# Patient Record
Sex: MALE | Race: BLACK OR AFRICAN AMERICAN
[De-identification: names, ages, dates, MRNs, and addresses within clinical notes are randomized per-mention and may not be internally consistent; named-entity substitution may affect disease eponyms.]

---

## 2017-01-09 ENCOUNTER — HOSPITAL ENCOUNTER (EMERGENCY)
Dept: HOSPITAL 35 - ER | Age: 77
Discharge: HOME | End: 2017-01-09
Payer: COMMERCIAL

## 2017-01-09 VITALS — SYSTOLIC BLOOD PRESSURE: 113 MMHG | DIASTOLIC BLOOD PRESSURE: 67 MMHG

## 2017-01-09 VITALS — WEIGHT: 160.01 LBS | BODY MASS INDEX: 22.4 KG/M2 | HEIGHT: 71 IN

## 2017-01-09 DIAGNOSIS — T83.098D: ICD-10-CM

## 2017-01-09 DIAGNOSIS — N39.0: Primary | ICD-10-CM

## 2017-01-09 DIAGNOSIS — E11.9: ICD-10-CM

## 2017-01-09 DIAGNOSIS — L03.311: ICD-10-CM

## 2017-01-09 DIAGNOSIS — K59.00: ICD-10-CM

## 2017-01-09 LAB
ALBUMIN SERPL-MCNC: 3.6 G/DL (ref 3.4–5)
ALP SERPL-CCNC: 145 U/L (ref 46–116)
ALT SERPL-CCNC: 17 U/L (ref 30–65)
ANION GAP SERPL CALC-SCNC: 9 MMOL/L (ref 7–16)
AST SERPL-CCNC: 11 U/L (ref 15–37)
BASOPHILS NFR BLD AUTO: 0.7 % (ref 0–2)
BILIRUB SERPL-MCNC: 0.4 MG/DL
BILIRUB UR-MCNC: NEGATIVE MG/DL
BUN SERPL-MCNC: 19 MG/DL (ref 7–18)
CALCIUM SERPL-MCNC: 8.9 MG/DL (ref 8.5–10.1)
CHLORIDE SERPL-SCNC: 103 MMOL/L (ref 98–107)
CO2 SERPL-SCNC: 26 MMOL/L (ref 21–32)
COLOR UR: YELLOW
CREAT SERPL-MCNC: 0.7 MG/DL (ref 0.6–1.3)
EOSINOPHIL NFR BLD: 1.9 % (ref 0–3)
ERYTHROCYTE [DISTWIDTH] IN BLOOD BY AUTOMATED COUNT: 15.2 % (ref 10.5–14.5)
GLUCOSE SERPL-MCNC: 110 MG/DL (ref 70–99)
GRANULOCYTES NFR BLD MANUAL: 71.2 % (ref 36–66)
HCT VFR BLD CALC: 43.4 % (ref 42–52)
HGB BLD-MCNC: 14.6 GM/DL (ref 14–18)
KETONES UR STRIP-MCNC: (no result) MG/DL
LYMPHOCYTES NFR BLD AUTO: 16.3 % (ref 24–44)
MANUAL DIFFERENTIAL PERFORMED BLD QL: NO
MCH RBC QN AUTO: 30.4 PG (ref 26–34)
MCHC RBC AUTO-ENTMCNC: 33.6 % (ref 28–37)
MCV RBC: 90.6 FL (ref 80–100)
MONOCYTES NFR BLD: 9.9 % (ref 1–8)
NEUTROPHILS # BLD: 8.6 THOU/UL (ref 1.4–8.2)
PLATELET # BLD: 191 THOU/UL (ref 150–400)
POTASSIUM SERPL-SCNC: 4.2 MMOL/L (ref 3.5–5.1)
PROT SERPL-MCNC: 8.7 G/DL (ref 6.4–8.2)
RBC # BLD AUTO: 4.79 MIL/UL (ref 4.5–6)
RBC # UR STRIP: (no result) /UL
SODIUM SERPL-SCNC: 138 MMOL/L (ref 136–145)
SP GR UR STRIP: 1.02 (ref 1–1.03)
SQUAMOUS: (no result) /LPF (ref 0–3)
URINE GLUCOSE-RANDOM*: NEGATIVE
URINE LEUKOCYTES-REFLEX: (no result)
URINE PROTEIN (DIPSTICK): (no result)
URINE WBC-REFLEX: (no result) /HPF (ref 0–5)
UROBILINOGEN UR STRIP-ACNC: 1 E.U./DL (ref 0.2–1)
WBC # BLD AUTO: 12.1 THOU/UL (ref 4–11)

## 2018-06-02 ENCOUNTER — HOSPITAL ENCOUNTER (INPATIENT)
Dept: HOSPITAL 35 - ER | Age: 78
LOS: 2 days | Discharge: HOME HEALTH | DRG: 698 | End: 2018-06-04
Attending: INTERNAL MEDICINE | Admitting: INTERNAL MEDICINE
Payer: COMMERCIAL

## 2018-06-02 VITALS — WEIGHT: 157.2 LBS | HEIGHT: 71 IN | BODY MASS INDEX: 22.01 KG/M2

## 2018-06-02 DIAGNOSIS — Z93.1: ICD-10-CM

## 2018-06-02 DIAGNOSIS — L89.614: ICD-10-CM

## 2018-06-02 DIAGNOSIS — N39.0: ICD-10-CM

## 2018-06-02 DIAGNOSIS — T83.518A: Primary | ICD-10-CM

## 2018-06-02 DIAGNOSIS — L89.152: ICD-10-CM

## 2018-06-02 DIAGNOSIS — E11.9: ICD-10-CM
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
## 2018-06-02 DIAGNOSIS — G82.50: ICD-10-CM

## 2018-06-02 DIAGNOSIS — Z79.899: ICD-10-CM

## 2018-06-02 DIAGNOSIS — Z90.49: ICD-10-CM

## 2018-06-02 DIAGNOSIS — L89.894: ICD-10-CM

## 2018-06-02 PROCEDURE — 10045: CPT

## 2018-06-03 VITALS — SYSTOLIC BLOOD PRESSURE: 107 MMHG | DIASTOLIC BLOOD PRESSURE: 63 MMHG

## 2018-06-03 VITALS — DIASTOLIC BLOOD PRESSURE: 61 MMHG | SYSTOLIC BLOOD PRESSURE: 111 MMHG

## 2018-06-03 VITALS — SYSTOLIC BLOOD PRESSURE: 116 MMHG | DIASTOLIC BLOOD PRESSURE: 70 MMHG

## 2018-06-03 VITALS — SYSTOLIC BLOOD PRESSURE: 121 MMHG | DIASTOLIC BLOOD PRESSURE: 65 MMHG

## 2018-06-03 VITALS — DIASTOLIC BLOOD PRESSURE: 65 MMHG | SYSTOLIC BLOOD PRESSURE: 112 MMHG

## 2018-06-03 VITALS — DIASTOLIC BLOOD PRESSURE: 75 MMHG | SYSTOLIC BLOOD PRESSURE: 116 MMHG

## 2018-06-03 VITALS — DIASTOLIC BLOOD PRESSURE: 55 MMHG | SYSTOLIC BLOOD PRESSURE: 108 MMHG

## 2018-06-03 VITALS — DIASTOLIC BLOOD PRESSURE: 65 MMHG | SYSTOLIC BLOOD PRESSURE: 102 MMHG

## 2018-06-03 LAB
ALBUMIN SERPL-MCNC: 3.6 G/DL (ref 3.4–5)
ALT SERPL-CCNC: 21 U/L (ref 30–65)
ANION GAP SERPL CALC-SCNC: 8 MMOL/L (ref 7–16)
AST SERPL-CCNC: 17 U/L (ref 15–37)
BACTERIA-REFLEX: (no result) /HPF
BASOPHILS NFR BLD AUTO: 0.6 % (ref 0–2)
BILIRUB SERPL-MCNC: 0.2 MG/DL
BILIRUB UR-MCNC: NEGATIVE MG/DL
BUN SERPL-MCNC: 25 MG/DL (ref 7–18)
CALCIUM SERPL-MCNC: 9 MG/DL (ref 8.5–10.1)
CHLORIDE SERPL-SCNC: 97 MMOL/L (ref 98–107)
CO2 SERPL-SCNC: 27 MMOL/L (ref 21–32)
COLOR UR: YELLOW
CREAT SERPL-MCNC: 0.9 MG/DL (ref 0.7–1.3)
EOSINOPHIL NFR BLD: 1.5 % (ref 0–3)
ERYTHROCYTE [DISTWIDTH] IN BLOOD BY AUTOMATED COUNT: 15.6 % (ref 10.5–14.5)
GLUCOSE SERPL-MCNC: 138 MG/DL (ref 74–106)
GRANULOCYTES NFR BLD MANUAL: 74.6 % (ref 36–66)
HCT VFR BLD CALC: 40.8 % (ref 42–52)
HGB BLD-MCNC: 13.8 GM/DL (ref 14–18)
KETONES UR STRIP-MCNC: NEGATIVE MG/DL
LYMPHOCYTES NFR BLD AUTO: 13.8 % (ref 24–44)
MCH RBC QN AUTO: 30.8 PG (ref 26–34)
MCHC RBC AUTO-ENTMCNC: 33.7 G/DL (ref 28–37)
MCV RBC: 91.4 FL (ref 80–100)
MONOCYTES NFR BLD: 9.5 % (ref 1–8)
MUCUS: (no result) STRN/LPF
NEUTROPHILS # BLD: 7.5 THOU/UL (ref 1.4–8.2)
PLATELET # BLD: 229 THOU/UL (ref 150–400)
POTASSIUM SERPL-SCNC: 5 MMOL/L (ref 3.5–5.1)
PROT SERPL-MCNC: 8.9 G/DL (ref 6.4–8.2)
RBC # BLD AUTO: 4.46 MIL/UL (ref 4.5–6)
RBC # UR STRIP: (no result) /UL
RBC #/AREA URNS HPF: (no result) /HPF (ref 0–2)
SODIUM SERPL-SCNC: 132 MMOL/L (ref 136–145)
SP GR UR STRIP: 1.01 (ref 1–1.03)
SQUAMOUS: (no result) /LPF (ref 0–3)
URINE CLARITY: (no result)
URINE GLUCOSE-RANDOM*: NEGATIVE
URINE LEUKOCYTES-REFLEX: (no result)
URINE NITRITE-REFLEX: NEGATIVE
URINE PROTEIN (DIPSTICK): NEGATIVE
URINE WBC-REFLEX: (no result) /HPF (ref 0–5)
UROBILINOGEN UR STRIP-ACNC: 1 E.U./DL (ref 0.2–1)
WBC # BLD AUTO: 10.1 THOU/UL (ref 4–11)

## 2018-06-04 VITALS — SYSTOLIC BLOOD PRESSURE: 112 MMHG | DIASTOLIC BLOOD PRESSURE: 65 MMHG

## 2020-06-30 ENCOUNTER — HOSPITAL ENCOUNTER (INPATIENT)
Dept: HOSPITAL 35 - ER | Age: 80
LOS: 13 days | Discharge: HOME HEALTH | DRG: 853 | End: 2020-07-13
Attending: INTERNAL MEDICINE | Admitting: INTERNAL MEDICINE
Payer: COMMERCIAL

## 2020-06-30 VITALS — HEIGHT: 72.01 IN | WEIGHT: 160 LBS | BODY MASS INDEX: 21.67 KG/M2

## 2020-06-30 VITALS — SYSTOLIC BLOOD PRESSURE: 143 MMHG | DIASTOLIC BLOOD PRESSURE: 66 MMHG

## 2020-06-30 VITALS — DIASTOLIC BLOOD PRESSURE: 89 MMHG | SYSTOLIC BLOOD PRESSURE: 162 MMHG

## 2020-06-30 DIAGNOSIS — E87.8: ICD-10-CM

## 2020-06-30 DIAGNOSIS — Z20.828: ICD-10-CM

## 2020-06-30 DIAGNOSIS — J96.22: ICD-10-CM

## 2020-06-30 DIAGNOSIS — G92: ICD-10-CM

## 2020-06-30 DIAGNOSIS — R47.02: ICD-10-CM

## 2020-06-30 DIAGNOSIS — E87.5: ICD-10-CM

## 2020-06-30 DIAGNOSIS — Z79.84: ICD-10-CM

## 2020-06-30 DIAGNOSIS — N39.0: ICD-10-CM

## 2020-06-30 DIAGNOSIS — G82.50: ICD-10-CM

## 2020-06-30 DIAGNOSIS — E43: ICD-10-CM

## 2020-06-30 DIAGNOSIS — J69.0: ICD-10-CM

## 2020-06-30 DIAGNOSIS — Z79.899: ICD-10-CM

## 2020-06-30 DIAGNOSIS — K94.23: ICD-10-CM

## 2020-06-30 DIAGNOSIS — E87.1: ICD-10-CM

## 2020-06-30 DIAGNOSIS — L89.613: ICD-10-CM

## 2020-06-30 DIAGNOSIS — J96.21: ICD-10-CM

## 2020-06-30 DIAGNOSIS — E11.51: ICD-10-CM

## 2020-06-30 DIAGNOSIS — L89.153: ICD-10-CM

## 2020-06-30 DIAGNOSIS — A41.9: Primary | ICD-10-CM

## 2020-06-30 DIAGNOSIS — L89.159: ICD-10-CM

## 2020-06-30 DIAGNOSIS — Z79.891: ICD-10-CM

## 2020-06-30 LAB
ALBUMIN SERPL-MCNC: 2.3 G/DL (ref 3.4–5)
ALT SERPL-CCNC: 15 U/L (ref 30–65)
ANION GAP SERPL CALC-SCNC: 1 MMOL/L (ref 7–16)
AST SERPL-CCNC: 22 U/L (ref 15–37)
BASOPHILS NFR BLD AUTO: 0.3 % (ref 0–2)
BE(VIVO): 7.4 MMOL/L
BILIRUB SERPL-MCNC: 0.1 MG/DL (ref 0.2–1)
BILIRUB UR-MCNC: NEGATIVE MG/DL
BUN SERPL-MCNC: 17 MG/DL (ref 7–18)
CALCIUM SERPL-MCNC: 6.5 MG/DL (ref 8.5–10.1)
CHLORIDE SERPL-SCNC: 108 MMOL/L (ref 98–107)
CO2 SERPL-SCNC: 31 MMOL/L (ref 21–32)
COLOR UR: YELLOW
CREAT SERPL-MCNC: 0.3 MG/DL (ref 0.7–1.3)
EOSINOPHIL NFR BLD: 0.7 % (ref 0–3)
ERYTHROCYTE [DISTWIDTH] IN BLOOD BY AUTOMATED COUNT: 16.2 % (ref 10.5–14.5)
GLUCOSE SERPL-MCNC: 134 MG/DL (ref 74–106)
GRANULOCYTES NFR BLD MANUAL: 89.3 % (ref 36–66)
HCO3 BLD-SCNC: 41.1 MMOL/L (ref 22–26)
HCT VFR BLD CALC: 43.1 % (ref 42–52)
HGB BLD-MCNC: 13.7 GM/DL (ref 14–18)
KETONES UR STRIP-MCNC: NEGATIVE MG/DL
LYMPHOCYTES NFR BLD AUTO: 4.2 % (ref 24–44)
MCH RBC QN AUTO: 30.2 PG (ref 26–34)
MCHC RBC AUTO-ENTMCNC: 31.9 G/DL (ref 28–37)
MCV RBC: 94.6 FL (ref 80–100)
MONOCYTES NFR BLD: 5.5 % (ref 1–8)
NEUTROPHILS # BLD: 10.8 THOU/UL (ref 1.4–8.2)
PCO2 BLD: 119.5 MMHG (ref 35–45)
PLATELET # BLD: 192 THOU/UL (ref 150–400)
PO2 BLD: 67.3 MMHG (ref 80–100)
POTASSIUM SERPL-SCNC: 3.7 MMOL/L (ref 3.5–5.1)
PROT SERPL-MCNC: 6.7 G/DL (ref 6.4–8.2)
RBC # BLD AUTO: 4.55 MIL/UL (ref 4.5–6)
RBC # UR STRIP: (no result) /UL
RBC #/AREA URNS HPF: (no result) /HPF (ref 0–2)
SODIUM SERPL-SCNC: 140 MMOL/L (ref 136–145)
SP GR UR STRIP: >= 1.03 (ref 1–1.03)
SQUAMOUS: (no result) /LPF (ref 0–3)
TROPONIN I SERPL-MCNC: <0.06 NG/ML (ref ?–0.06)
URINE CLARITY: (no result)
URINE GLUCOSE-RANDOM*: NEGATIVE
URINE LEUKOCYTES-REFLEX: (no result)
URINE NITRITE-REFLEX: NEGATIVE
URINE PROTEIN (DIPSTICK): (no result)
URINE WBC-REFLEX: (no result) /HPF (ref 0–5)
UROBILINOGEN UR STRIP-ACNC: 0.2 E.U./DL (ref 0.2–1)
WBC # BLD AUTO: 12.1 THOU/UL (ref 4–11)

## 2020-06-30 PROCEDURE — 10078: CPT

## 2020-06-30 PROCEDURE — 70005: CPT

## 2020-06-30 PROCEDURE — 10081 I&D PILONIDAL CYST COMP: CPT

## 2020-06-30 PROCEDURE — 62900: CPT

## 2020-06-30 PROCEDURE — 62110: CPT

## 2020-06-30 PROCEDURE — 0DP63UZ REMOVAL OF FEEDING DEVICE FROM STOMACH, PERCUTANEOUS APPROACH: ICD-10-PCS | Performed by: INTERNAL MEDICINE

## 2020-06-30 PROCEDURE — 27000 TENOTOMY ADDUCTOR HIP PERQ: CPT

## 2020-06-30 PROCEDURE — 0DH63UZ INSERTION OF FEEDING DEVICE INTO STOMACH, PERCUTANEOUS APPROACH: ICD-10-PCS | Performed by: INTERNAL MEDICINE

## 2020-07-01 VITALS — DIASTOLIC BLOOD PRESSURE: 52 MMHG | SYSTOLIC BLOOD PRESSURE: 110 MMHG

## 2020-07-01 VITALS — DIASTOLIC BLOOD PRESSURE: 76 MMHG | SYSTOLIC BLOOD PRESSURE: 150 MMHG

## 2020-07-01 VITALS — SYSTOLIC BLOOD PRESSURE: 128 MMHG | DIASTOLIC BLOOD PRESSURE: 80 MMHG

## 2020-07-01 VITALS — SYSTOLIC BLOOD PRESSURE: 106 MMHG | DIASTOLIC BLOOD PRESSURE: 61 MMHG

## 2020-07-01 VITALS — SYSTOLIC BLOOD PRESSURE: 95 MMHG | DIASTOLIC BLOOD PRESSURE: 63 MMHG

## 2020-07-01 VITALS — DIASTOLIC BLOOD PRESSURE: 48 MMHG | SYSTOLIC BLOOD PRESSURE: 88 MMHG

## 2020-07-01 VITALS — SYSTOLIC BLOOD PRESSURE: 119 MMHG | DIASTOLIC BLOOD PRESSURE: 75 MMHG

## 2020-07-01 VITALS — DIASTOLIC BLOOD PRESSURE: 79 MMHG | SYSTOLIC BLOOD PRESSURE: 157 MMHG

## 2020-07-01 VITALS — SYSTOLIC BLOOD PRESSURE: 145 MMHG | DIASTOLIC BLOOD PRESSURE: 80 MMHG

## 2020-07-01 VITALS — SYSTOLIC BLOOD PRESSURE: 139 MMHG | DIASTOLIC BLOOD PRESSURE: 76 MMHG

## 2020-07-01 VITALS — SYSTOLIC BLOOD PRESSURE: 108 MMHG | DIASTOLIC BLOOD PRESSURE: 66 MMHG

## 2020-07-01 VITALS — DIASTOLIC BLOOD PRESSURE: 48 MMHG | SYSTOLIC BLOOD PRESSURE: 101 MMHG

## 2020-07-01 VITALS — DIASTOLIC BLOOD PRESSURE: 76 MMHG | SYSTOLIC BLOOD PRESSURE: 144 MMHG

## 2020-07-01 VITALS — SYSTOLIC BLOOD PRESSURE: 116 MMHG | DIASTOLIC BLOOD PRESSURE: 61 MMHG

## 2020-07-01 VITALS — DIASTOLIC BLOOD PRESSURE: 80 MMHG | SYSTOLIC BLOOD PRESSURE: 120 MMHG

## 2020-07-01 VITALS — SYSTOLIC BLOOD PRESSURE: 108 MMHG | DIASTOLIC BLOOD PRESSURE: 70 MMHG

## 2020-07-01 VITALS — SYSTOLIC BLOOD PRESSURE: 158 MMHG | DIASTOLIC BLOOD PRESSURE: 83 MMHG

## 2020-07-01 VITALS — DIASTOLIC BLOOD PRESSURE: 59 MMHG | SYSTOLIC BLOOD PRESSURE: 104 MMHG

## 2020-07-01 VITALS — SYSTOLIC BLOOD PRESSURE: 103 MMHG | DIASTOLIC BLOOD PRESSURE: 57 MMHG

## 2020-07-01 VITALS — DIASTOLIC BLOOD PRESSURE: 41 MMHG | SYSTOLIC BLOOD PRESSURE: 85 MMHG

## 2020-07-01 VITALS — DIASTOLIC BLOOD PRESSURE: 82 MMHG | SYSTOLIC BLOOD PRESSURE: 157 MMHG

## 2020-07-01 VITALS — SYSTOLIC BLOOD PRESSURE: 110 MMHG | DIASTOLIC BLOOD PRESSURE: 52 MMHG

## 2020-07-01 VITALS — DIASTOLIC BLOOD PRESSURE: 75 MMHG | SYSTOLIC BLOOD PRESSURE: 114 MMHG

## 2020-07-01 VITALS — DIASTOLIC BLOOD PRESSURE: 82 MMHG | SYSTOLIC BLOOD PRESSURE: 129 MMHG

## 2020-07-01 VITALS — DIASTOLIC BLOOD PRESSURE: 69 MMHG | SYSTOLIC BLOOD PRESSURE: 117 MMHG

## 2020-07-01 VITALS — SYSTOLIC BLOOD PRESSURE: 99 MMHG | DIASTOLIC BLOOD PRESSURE: 64 MMHG

## 2020-07-01 VITALS — DIASTOLIC BLOOD PRESSURE: 71 MMHG | SYSTOLIC BLOOD PRESSURE: 106 MMHG

## 2020-07-01 VITALS — SYSTOLIC BLOOD PRESSURE: 134 MMHG | DIASTOLIC BLOOD PRESSURE: 80 MMHG

## 2020-07-01 VITALS — SYSTOLIC BLOOD PRESSURE: 130 MMHG | DIASTOLIC BLOOD PRESSURE: 61 MMHG

## 2020-07-01 VITALS — SYSTOLIC BLOOD PRESSURE: 114 MMHG | DIASTOLIC BLOOD PRESSURE: 74 MMHG

## 2020-07-01 LAB
ANION GAP SERPL CALC-SCNC: 10 MMOL/L (ref 7–16)
BE(VIVO): 0.5 MMOL/L
BE(VIVO): 0.6 MMOL/L
BE(VIVO): 4.4 MMOL/L
BE(VIVO): 6.1 MMOL/L
BUN SERPL-MCNC: 20 MG/DL (ref 7–18)
CALCIUM SERPL-MCNC: 9 MG/DL (ref 8.5–10.1)
CHLORIDE SERPL-SCNC: 97 MMOL/L (ref 98–107)
CO2 SERPL-SCNC: 27 MMOL/L (ref 21–32)
CREAT SERPL-MCNC: 0.6 MG/DL (ref 0.7–1.3)
ERYTHROCYTE [DISTWIDTH] IN BLOOD BY AUTOMATED COUNT: 16.6 % (ref 10.5–14.5)
GLUCOSE SERPL-MCNC: 167 MG/DL (ref 74–106)
HCO3 BLD-SCNC: 23.3 MMOL/L (ref 22–26)
HCO3 BLD-SCNC: 26.3 MMOL/L (ref 22–26)
HCO3 BLD-SCNC: 34.8 MMOL/L (ref 22–26)
HCO3 BLD-SCNC: 40 MMOL/L (ref 22–26)
HCT VFR BLD CALC: 43.5 % (ref 42–52)
HGB BLD-MCNC: 14 GM/DL (ref 14–18)
MCH RBC QN AUTO: 30.7 PG (ref 26–34)
MCHC RBC AUTO-ENTMCNC: 32.2 G/DL (ref 28–37)
MCV RBC: 95.5 FL (ref 80–100)
PCO2 BLD: 120.5 MMHG (ref 35–45)
PCO2 BLD: 120.7 MMHG (ref 35–45)
PCO2 BLD: 31.1 MMHG (ref 35–45)
PCO2 BLD: 32.1 MMHG (ref 35–45)
PLATELET # BLD: 160 THOU/UL (ref 150–400)
PO2 BLD: 109.7 MMHG (ref 80–100)
PO2 BLD: 112.2 MMHG (ref 80–100)
PO2 BLD: 83.5 MMHG (ref 80–100)
PO2 BLD: 83.6 MMHG (ref 80–100)
POTASSIUM SERPL-SCNC: 5 MMOL/L (ref 3.5–5.1)
RBC # BLD AUTO: 4.55 MIL/UL (ref 4.5–6)
SODIUM SERPL-SCNC: 134 MMOL/L (ref 136–145)
WBC # BLD AUTO: 13.2 THOU/UL (ref 4–11)

## 2020-07-01 PROCEDURE — 04HK3DZ INSERTION OF INTRALUMINAL DEVICE INTO RIGHT FEMORAL ARTERY, PERCUTANEOUS APPROACH: ICD-10-PCS | Performed by: INTERNAL MEDICINE

## 2020-07-01 PROCEDURE — 02HV33Z INSERTION OF INFUSION DEVICE INTO SUPERIOR VENA CAVA, PERCUTANEOUS APPROACH: ICD-10-PCS | Performed by: INTERNAL MEDICINE

## 2020-07-01 PROCEDURE — 067G3DZ DILATION OF LEFT EXTERNAL ILIAC VEIN WITH INTRALUMINAL DEVICE, PERCUTANEOUS APPROACH: ICD-10-PCS | Performed by: INTERNAL MEDICINE

## 2020-07-01 PROCEDURE — B4181ZZ FLUOROSCOPY OF BILATERAL RENAL ARTERIES USING LOW OSMOLAR CONTRAST: ICD-10-PCS | Performed by: INTERNAL MEDICINE

## 2020-07-01 PROCEDURE — 0BH17EZ INSERTION OF ENDOTRACHEAL AIRWAY INTO TRACHEA, VIA NATURAL OR ARTIFICIAL OPENING: ICD-10-PCS | Performed by: INTERNAL MEDICINE

## 2020-07-01 PROCEDURE — 047J3DZ DILATION OF LEFT EXTERNAL ILIAC ARTERY WITH INTRALUMINAL DEVICE, PERCUTANEOUS APPROACH: ICD-10-PCS | Performed by: INTERNAL MEDICINE

## 2020-07-01 PROCEDURE — 5A09357 ASSISTANCE WITH RESPIRATORY VENTILATION, LESS THAN 24 CONSECUTIVE HOURS, CONTINUOUS POSITIVE AIRWAY PRESSURE: ICD-10-PCS | Performed by: INTERNAL MEDICINE

## 2020-07-01 PROCEDURE — 5A1945Z RESPIRATORY VENTILATION, 24-96 CONSECUTIVE HOURS: ICD-10-PCS | Performed by: INTERNAL MEDICINE

## 2020-07-01 PROCEDURE — 047H3DZ DILATION OF RIGHT EXTERNAL ILIAC ARTERY WITH INTRALUMINAL DEVICE, PERCUTANEOUS APPROACH: ICD-10-PCS | Performed by: INTERNAL MEDICINE

## 2020-07-01 PROCEDURE — 04CK3ZZ EXTIRPATION OF MATTER FROM RIGHT FEMORAL ARTERY, PERCUTANEOUS APPROACH: ICD-10-PCS | Performed by: INTERNAL MEDICINE
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
## 2020-07-01 NOTE — EKG
[facility]
[address]ira [address]
[place]   [address]                     ELECTROCARDIOGRAM REPORT      
_______________________________________________________________________________
 
Name:       IDA HUMPHRIES                Room #:         236-P       ADM IN  
M.R.#:      [id number]                       Account #:      [id number]  
Admission:  20    Attend Phys:    Binh Jeffers MD   
Discharge:              Date of Birth:  [birth year]  
                                                          Report #: [id number]
                                                                    [id number]
_______________________________________________________________________________
THIS REPORT FOR:  
 
cc:  [facility] - [facility] physician unknown
     [facility] physician unknown
     Darrel Orosco MD [facility]
THIS REPORT FOR:   //name//                          
 
                         [facility] ED
                                       
Test Date:    2020               Test Time:    22:03:48
Pat Name:     IDA HUMPHRIES           Department:   
Patient ID:   [id number]            Room:         [facility]
Gender:       M                        Technician:   LUCIANO
:          1940               Requested By: Laura Saeed
Order Number: [id number] MD:   Darrel Orosco
                                 Measurements
Intervals                              Axis          
Rate:         106                      P:            71
NV:           208                      QRS:          -23
QRSD:         84                       T:            87
QT:           325                                    
QTc:          432                                    
                           Interpretive Statements
Sinus tachycardia
Borderline prolonged NV interval
Borderline left axis deviation
Baseline wander in lead(s) V2,V6
No previous ECG available for comparison
Electronically Signed On 2020 8:12:14 CDT by Darrel Orosco
[email or web address]
 
 
 
 
 
 
 
 
 
 
 
 
 
 
  <ELECTRONICALLY SIGNED>
   By: Darrel Orosco MD, [facility]   
  20
D: 20                           _____________________________________
T: 20                           Darrel Orosco MD, [facility]     /EPI

## 2020-07-01 NOTE — NUR
RISK, BENEFITS, AND ALTERNATIVE TREATMENT DISCUSSED WITH THE DPOA RELATED TO
PICC PROCEDURE. TEACHING GIVEN RELATED TO POSSIBLE COMPLICATIONS SUCH AS
BLEEDING, INFECTION, CLOT, OR VESSEL PERFORATION. INSTRUCTION GIVEN RELATED TO
CLABSI PREVENTION WITH LITERATURE LEFT AT THE BEDSIDE. DPOA VOICES
UNDERSTANDING TO THE ABOVE. CONSENT OBTAINED. TRIPLE LUMEN PICC PLACED TO RUE.
ONE STICK AND NO COMPLICATIONS. PATIENT TOLERATED WELL. PICC LENGHT =38CM.
EXT.=0CM. V.O.=22%. TIP OF PICC VERIFIED SVC PER CHEST XRAY. KASIA MACK
NOTIFIED OKAY TO USE PICC.

## 2020-07-01 NOTE — NUR
CAREGIVER-TUSHAR STRONG INQUIRED X 2 REGARDING PT STATUS. UPDATED ON VENT,
DECREASING SEDATION, INTERMITTENTLY ALERT, NODDING YES AND NO. DR. LANDA, GI
PRESENT, THEN ADJUSTED PEG TUBE. NO FURTHER LEAKING SINCE ADJUSTMENT. ALL
QUESTIONS ANSWERED TO SATISFACTION.
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
## 2020-07-01 NOTE — NUR
WOUND CONSULT;
ASSESSMENT TODAY REVEALED A STAGE 3 WOUND TO THE RIGHT HEEL AND THE RIGHT
BUTTOCKS WIT5H BEEFY RED TISSUE TO BOTH. BOTH WOUNDS LOOK VERY HEALTHY BUT
CANNOT R/O A SILIENT INFECTION.
 
RECOMMENDATION;  CONSULT DR WHYTE FOR HIS OPINION.
 
DISCUSSED WITH KASIA

## 2020-07-01 NOTE — NUR
When tube feed ready to start, recommend glucerna 1.2 at 80ml/hr.  Hold water
flushes while on  ml/hr

## 2020-07-01 NOTE — NUR
RECEIVED PT FROM ER TO . PT R/O COVID, TESTED NEGATIVE AT  LAST WEEK
PER CAREGIVER. ENHANCED PRECAUTIONS FOLLOWED. PT CONT TO BE LETHARGIC, AND
MINIMALLY RESPONSIVE ON BIPAP. PC02 CONT . ER PHYSICIAN SPOKE WITH PT
DPOA REGARDING INTUBATION. PT WAS INTUBATED, AND WAS ALERT WITHIN IN LESS THAN
30 MINUTES, PT MOUTHING WORDS AND NODDING YES/NO APPROPRIATELY. DOES GAG ON
TUBE/AND ORAL SECRETIONS DUE TO HYPEREXTENDED NECK POSITION. PROPOFOL STARTED
FOR LIGHT SEDATION. VS WNL. UO MINIMALLY ADEQUATE. IVF INFUSING. PEG TUBE
LEAKING OLD TUBE FEEDING FROM INSERT SITE. INSERTED AT  AND HAS BEEN ONGOING
ISSUE REGARDING LEAKING. CONSULT CALLED TO DR VERONICA-AWAITING CALL BACK. PT
PROGRESSING TOWARD GOALS. CONT PLAN OF CARE. INTUBATED AT 0315.

## 2020-07-01 NOTE — NUR
WAS NOTIFIED BY AISHA FROM [employer]  PT ON SERVICE WITH THEN PRIOR TO ADM
FAXED CLINICAL UPDATE AND RECEIVED CONFIRMATION. DP TO FOLLOW.

## 2020-07-02 VITALS — SYSTOLIC BLOOD PRESSURE: 143 MMHG | DIASTOLIC BLOOD PRESSURE: 98 MMHG

## 2020-07-02 VITALS — SYSTOLIC BLOOD PRESSURE: 137 MMHG | DIASTOLIC BLOOD PRESSURE: 89 MMHG

## 2020-07-02 VITALS — SYSTOLIC BLOOD PRESSURE: 117 MMHG | DIASTOLIC BLOOD PRESSURE: 77 MMHG

## 2020-07-02 VITALS — SYSTOLIC BLOOD PRESSURE: 147 MMHG | DIASTOLIC BLOOD PRESSURE: 94 MMHG

## 2020-07-02 VITALS — DIASTOLIC BLOOD PRESSURE: 92 MMHG | SYSTOLIC BLOOD PRESSURE: 152 MMHG

## 2020-07-02 VITALS — DIASTOLIC BLOOD PRESSURE: 79 MMHG | SYSTOLIC BLOOD PRESSURE: 135 MMHG

## 2020-07-02 VITALS — SYSTOLIC BLOOD PRESSURE: 145 MMHG | DIASTOLIC BLOOD PRESSURE: 96 MMHG

## 2020-07-02 VITALS — DIASTOLIC BLOOD PRESSURE: 93 MMHG | SYSTOLIC BLOOD PRESSURE: 136 MMHG

## 2020-07-02 VITALS — DIASTOLIC BLOOD PRESSURE: 85 MMHG | SYSTOLIC BLOOD PRESSURE: 143 MMHG

## 2020-07-02 VITALS — SYSTOLIC BLOOD PRESSURE: 121 MMHG | DIASTOLIC BLOOD PRESSURE: 76 MMHG

## 2020-07-02 VITALS — DIASTOLIC BLOOD PRESSURE: 93 MMHG | SYSTOLIC BLOOD PRESSURE: 142 MMHG

## 2020-07-02 VITALS — SYSTOLIC BLOOD PRESSURE: 129 MMHG | DIASTOLIC BLOOD PRESSURE: 86 MMHG

## 2020-07-02 VITALS — DIASTOLIC BLOOD PRESSURE: 96 MMHG | SYSTOLIC BLOOD PRESSURE: 140 MMHG

## 2020-07-02 VITALS — SYSTOLIC BLOOD PRESSURE: 115 MMHG | DIASTOLIC BLOOD PRESSURE: 75 MMHG

## 2020-07-02 VITALS — DIASTOLIC BLOOD PRESSURE: 97 MMHG | SYSTOLIC BLOOD PRESSURE: 137 MMHG

## 2020-07-02 VITALS — DIASTOLIC BLOOD PRESSURE: 82 MMHG | SYSTOLIC BLOOD PRESSURE: 130 MMHG

## 2020-07-02 VITALS — SYSTOLIC BLOOD PRESSURE: 147 MMHG | DIASTOLIC BLOOD PRESSURE: 95 MMHG

## 2020-07-02 VITALS — SYSTOLIC BLOOD PRESSURE: 132 MMHG | DIASTOLIC BLOOD PRESSURE: 82 MMHG

## 2020-07-02 VITALS — DIASTOLIC BLOOD PRESSURE: 89 MMHG | SYSTOLIC BLOOD PRESSURE: 148 MMHG

## 2020-07-02 VITALS — SYSTOLIC BLOOD PRESSURE: 117 MMHG | DIASTOLIC BLOOD PRESSURE: 78 MMHG

## 2020-07-02 VITALS — SYSTOLIC BLOOD PRESSURE: 135 MMHG | DIASTOLIC BLOOD PRESSURE: 87 MMHG

## 2020-07-02 VITALS — DIASTOLIC BLOOD PRESSURE: 88 MMHG | SYSTOLIC BLOOD PRESSURE: 125 MMHG

## 2020-07-02 LAB
BE(VIVO): -1.2 MMOL/L
HCO3 BLD-SCNC: 23.5 MMOL/L (ref 22–26)
PCO2 BLD: 39.2 MMHG (ref 35–45)
PO2 BLD: 183.9 MMHG (ref 80–100)

## 2020-07-02 NOTE — NUR
Pt is responsive to cares, he nodded yes when asked if he was in pain after
his bath, but did not respond when asked if he wanted any prn meds. His vitals
signs were stable, and heart rate decreased after a short time. The buttocks
wounds were re-dressed, a small amount of bleeding was noted with the left
side, wound bed was pink/red with 75% of slough seen, no odor. The G-Tube site
was reddened, gauze was placed, no further leaking was seen. The suprapubic
cather had 225 ml's of dylon urine, the site is intact, no leaking noted. The
pt is slow to reach POC goals, the bed is in the low/locked position and
siderails are up x 4.

## 2020-07-02 NOTE — NUR
1100-DR VERONICA ROUNDED ON PATIENT. ORDER TO PLACE PATIENT ON CPAP FOR ONE HOUR,
THEN DRAW ABGS, AND PUT BACK ON AC.
 
1145-RT PLACED PATIENT ON CPAP MODE. PROPOFOL GTT STOPPED AT THIS TIME.
PATIENT RESPONDS TO STAFF BY NODDING HEAD YES OR NO. PATIENT APPEARS
COMFORTABLE. OXYGEN SATURATIONS MAINTAINED BETWEEN %.
 
1341-RECEIVED ABGS RESULTS.
 
1346-ABGS RESULTS CALLED TO DR VERONICA. ORDER TO EXTUBATE PATIENT. ORDER TO START
GTUBE FEEDINGS GLUCERNA 1.2 WITH GOAL OF 80CC/HR. HOLD WATER FLUSHES IF PT IS
ON IVF.
 
1455-RT EXTUBATED PATIENT. PATIENT PLACED ON FACE SHIELD AT 35% FIO2.
 
1530-VANC TROUGH 17. PAGED DR BRAUN REGARDING VANC DOSE.
 
1546-UPDATED DR BRAUN. ORDER TO GIVE VANC DOSE AS ORDERED.

## 2020-07-02 NOTE — NUR
discussed during los, not anticipated dc, remain on vent. cm left message
requested sofya  [phone] to call back

## 2020-07-03 VITALS — SYSTOLIC BLOOD PRESSURE: 154 MMHG | DIASTOLIC BLOOD PRESSURE: 98 MMHG

## 2020-07-03 VITALS — DIASTOLIC BLOOD PRESSURE: 60 MMHG | SYSTOLIC BLOOD PRESSURE: 93 MMHG

## 2020-07-03 VITALS — SYSTOLIC BLOOD PRESSURE: 108 MMHG | DIASTOLIC BLOOD PRESSURE: 72 MMHG

## 2020-07-03 VITALS — DIASTOLIC BLOOD PRESSURE: 56 MMHG | SYSTOLIC BLOOD PRESSURE: 91 MMHG

## 2020-07-03 VITALS — DIASTOLIC BLOOD PRESSURE: 65 MMHG | SYSTOLIC BLOOD PRESSURE: 102 MMHG

## 2020-07-03 VITALS — SYSTOLIC BLOOD PRESSURE: 105 MMHG | DIASTOLIC BLOOD PRESSURE: 70 MMHG

## 2020-07-03 VITALS — DIASTOLIC BLOOD PRESSURE: 75 MMHG | SYSTOLIC BLOOD PRESSURE: 116 MMHG

## 2020-07-03 VITALS — DIASTOLIC BLOOD PRESSURE: 90 MMHG | SYSTOLIC BLOOD PRESSURE: 142 MMHG

## 2020-07-03 VITALS — SYSTOLIC BLOOD PRESSURE: 98 MMHG | DIASTOLIC BLOOD PRESSURE: 67 MMHG

## 2020-07-03 VITALS — DIASTOLIC BLOOD PRESSURE: 96 MMHG | SYSTOLIC BLOOD PRESSURE: 141 MMHG

## 2020-07-03 VITALS — DIASTOLIC BLOOD PRESSURE: 55 MMHG | SYSTOLIC BLOOD PRESSURE: 95 MMHG

## 2020-07-03 VITALS — SYSTOLIC BLOOD PRESSURE: 149 MMHG | DIASTOLIC BLOOD PRESSURE: 100 MMHG

## 2020-07-03 VITALS — SYSTOLIC BLOOD PRESSURE: 121 MMHG | DIASTOLIC BLOOD PRESSURE: 83 MMHG

## 2020-07-03 VITALS — SYSTOLIC BLOOD PRESSURE: 112 MMHG | DIASTOLIC BLOOD PRESSURE: 73 MMHG

## 2020-07-03 VITALS — DIASTOLIC BLOOD PRESSURE: 72 MMHG | SYSTOLIC BLOOD PRESSURE: 105 MMHG

## 2020-07-03 VITALS — SYSTOLIC BLOOD PRESSURE: 164 MMHG | DIASTOLIC BLOOD PRESSURE: 84 MMHG

## 2020-07-03 VITALS — DIASTOLIC BLOOD PRESSURE: 92 MMHG | SYSTOLIC BLOOD PRESSURE: 153 MMHG

## 2020-07-03 VITALS — SYSTOLIC BLOOD PRESSURE: 123 MMHG | DIASTOLIC BLOOD PRESSURE: 83 MMHG

## 2020-07-03 VITALS — SYSTOLIC BLOOD PRESSURE: 144 MMHG | DIASTOLIC BLOOD PRESSURE: 92 MMHG

## 2020-07-03 LAB
ANION GAP SERPL CALC-SCNC: 9 MMOL/L (ref 7–16)
BASOPHILS NFR BLD AUTO: 0.2 % (ref 0–2)
BE(VIVO): -2.3 MMOL/L
BUN SERPL-MCNC: 15 MG/DL (ref 7–18)
CALCIUM SERPL-MCNC: 8 MG/DL (ref 8.5–10.1)
CHLORIDE SERPL-SCNC: 104 MMOL/L (ref 98–107)
CO2 SERPL-SCNC: 25 MMOL/L (ref 21–32)
CREAT SERPL-MCNC: 0.3 MG/DL (ref 0.7–1.3)
EOSINOPHIL NFR BLD: 0.2 % (ref 0–3)
ERYTHROCYTE [DISTWIDTH] IN BLOOD BY AUTOMATED COUNT: 16.4 % (ref 10.5–14.5)
GLUCOSE SERPL-MCNC: 127 MG/DL (ref 74–106)
GRANULOCYTES NFR BLD MANUAL: 80.8 % (ref 36–66)
HCO3 BLD-SCNC: 23.3 MMOL/L (ref 22–26)
HCT VFR BLD CALC: 34.1 % (ref 42–52)
HGB BLD-MCNC: 11.2 GM/DL (ref 14–18)
LYMPHOCYTES NFR BLD AUTO: 8.4 % (ref 24–44)
MCH RBC QN AUTO: 30.2 PG (ref 26–34)
MCHC RBC AUTO-ENTMCNC: 32.9 G/DL (ref 28–37)
MCV RBC: 91.7 FL (ref 80–100)
MONOCYTES NFR BLD: 10.4 % (ref 1–8)
NEUTROPHILS # BLD: 11.9 THOU/UL (ref 1.4–8.2)
PCO2 BLD: 42.8 MMHG (ref 35–45)
PLATELET # BLD: 183 THOU/UL (ref 150–400)
PO2 BLD: 113.2 MMHG (ref 80–100)
POTASSIUM SERPL-SCNC: 2.8 MMOL/L (ref 3.5–5.1)
RBC # BLD AUTO: 3.72 MIL/UL (ref 4.5–6)
SODIUM SERPL-SCNC: 138 MMOL/L (ref 136–145)
WBC # BLD AUTO: 14.7 THOU/UL (ref 4–11)

## 2020-07-03 NOTE — NUR
ASSUMED CARE AT 0700, ASSESSMENT AND VITAL SIGNS COMPLETED PER ICU PROTOCOL.
DR. WELCH ROUNDED THIS AM, PLAN OF CARE DISCUSSED AND NEW ORDERS RECEIVED.
RN WILL CONTINUE TO MONITOR.

## 2020-07-03 NOTE — NUR
ASSUMED PT CARE AT 1900. PT IS ALERT AND ORIENTED. NO SIGN OF DISTRESS NOTED
IN PT. PT IS STABLE. AT THE BEGINNIN OF SHIFT, SUPRAPUBIC CATH LEAKING, BALLON
REINFLATED. J-TUBE ALSO NOTING TO BE LEAKING AS WELL THROUGH THE NIGHT.
DRESSING ON J-TUBE DONE. REPOSITIONING. ASSESSMENT COMPLETED AND DOCUMENTED.
SCHEDULED MEDS ADMINISTERED TO PT. PT IS NPO. TUBE FEEDING CONTINOUS. CONTINUE
TO MONITOR PT. DENIES ANY NEEDS AT THIS TIME

## 2020-07-03 NOTE — NUR
ASSUMMED PT CARE AT APPROXIMATELY 1530. PT A&O X3. ASSESSMENT AS CHARTED. FALL
PRECAUTIONS IN PLACE. PT DENIES HAVING CHEST PAIN. PT DENIES HAVING CHEST
PAIN. PT DENIES HAVING SOB. PT DENIES HAVING CHEST PAIN. PT DENIES HAVING
ACUTE PAIN. PT COMFORTABLE IN BED. VITAL SIGNS STABLE. BLOOD SUGARS STABLE.

## 2020-07-04 VITALS — SYSTOLIC BLOOD PRESSURE: 155 MMHG | DIASTOLIC BLOOD PRESSURE: 89 MMHG

## 2020-07-04 VITALS — SYSTOLIC BLOOD PRESSURE: 142 MMHG | DIASTOLIC BLOOD PRESSURE: 76 MMHG

## 2020-07-04 VITALS — SYSTOLIC BLOOD PRESSURE: 178 MMHG | DIASTOLIC BLOOD PRESSURE: 81 MMHG

## 2020-07-04 VITALS — DIASTOLIC BLOOD PRESSURE: 87 MMHG | SYSTOLIC BLOOD PRESSURE: 171 MMHG

## 2020-07-04 VITALS — DIASTOLIC BLOOD PRESSURE: 71 MMHG | SYSTOLIC BLOOD PRESSURE: 152 MMHG

## 2020-07-04 VITALS — DIASTOLIC BLOOD PRESSURE: 73 MMHG | SYSTOLIC BLOOD PRESSURE: 157 MMHG

## 2020-07-04 NOTE — NUR
QUADRIPLEGIC. COMPLETE CARES. TUBE FEEDING NO LONGER LEAKING AFTER DR. LANDA'S INTERVENTION. SUPERPUBIC CATH STILL LEAKING; WILL CHECK BALLOON
VOLUME AND SEE IF CAN BE SAFELY INCREASED. GOWN, BED LINENS CHANGED D/T URINE
LEAK. SR PER TELE. WILL CONTINUE TO FOLLOW CLOSELY.

## 2020-07-04 NOTE — NUR
GLUCERNA 1.2 WAS INCREASED TO 80 ML/HR WHICH IS THE GOAL RATE.RESIDUAL IS LESS
THAN 30 CC.PATIENT FEELS FULL SO TUBE FFEDING WAS STOPPED FOR ONE HOUR AND
RESUMED.PATIENT FELT BETTER.REPOSITIONED Q2 HOURS AND AS NEEDED.PAIN WELL
CONTROLLED.MONITOR SHOWS SR.POC CONTINUED.

## 2020-07-05 VITALS — DIASTOLIC BLOOD PRESSURE: 56 MMHG | SYSTOLIC BLOOD PRESSURE: 142 MMHG

## 2020-07-05 VITALS — DIASTOLIC BLOOD PRESSURE: 82 MMHG | SYSTOLIC BLOOD PRESSURE: 162 MMHG

## 2020-07-05 VITALS — SYSTOLIC BLOOD PRESSURE: 141 MMHG | DIASTOLIC BLOOD PRESSURE: 74 MMHG

## 2020-07-05 VITALS — DIASTOLIC BLOOD PRESSURE: 80 MMHG | SYSTOLIC BLOOD PRESSURE: 158 MMHG

## 2020-07-05 VITALS — SYSTOLIC BLOOD PRESSURE: 141 MMHG | DIASTOLIC BLOOD PRESSURE: 61 MMHG

## 2020-07-05 VITALS — SYSTOLIC BLOOD PRESSURE: 165 MMHG | DIASTOLIC BLOOD PRESSURE: 64 MMHG

## 2020-07-05 NOTE — NUR
PT CARE ASSUMED APPROX 0700. ASSESSMENTS AS CHARTED. PT REPORTS 6/10 LEFT FOOT
PAIN. PT REPORTS ADEQUATE PAIN MANAGEMENT BUT PT'S DPOA DOES NOT WANT HIM TO
HAVE MORPHINE. CALL OUT TO DR CHRISTENSEN FOR PAIN MANAGEMENT POC CHANGES. PT
REFUSING TURNS AT TIMES THIS SHIFT. WOUND CARE COMPLETED AS ORDERED. PT
TOLERATING POC. VSS. BS SLIGHTLY ELEVATED. SSI MANAGING. CLINICAL UPDATE GIVEN
TO DPOA. SHE WANTS TO SIGN PROCEDURE CONSENT IN THE AM. PT IN NO DISTRESS AT
THIS TIME.

## 2020-07-05 NOTE — NUR
PT IS ALERT AND ORIENTED X4. LEGALLY BLIND. LUNG ARE DIMINISHED. ON ROOM AIR.
GETS BREATHING TREATEMTNS. WOUNDS FOR WOUND TREATEMENT PER CARE PLAN.
TOLERATING TUBE FEEDING. SUPRAPUBIC CATHETER.ON ANTIBIOTCS AS ON MAR FOR CARE
PLAN. TURN Q 2 HOURS. VITALS ARE STABLE. BOOTS ON BILAERAL. WILL CONTINUE TO
ASSESS AND MONITOR PER NURSING

## 2020-07-06 VITALS — DIASTOLIC BLOOD PRESSURE: 68 MMHG | SYSTOLIC BLOOD PRESSURE: 132 MMHG

## 2020-07-06 VITALS — DIASTOLIC BLOOD PRESSURE: 80 MMHG | SYSTOLIC BLOOD PRESSURE: 159 MMHG

## 2020-07-06 VITALS — SYSTOLIC BLOOD PRESSURE: 153 MMHG | DIASTOLIC BLOOD PRESSURE: 80 MMHG

## 2020-07-06 VITALS — DIASTOLIC BLOOD PRESSURE: 70 MMHG | SYSTOLIC BLOOD PRESSURE: 123 MMHG

## 2020-07-06 VITALS — DIASTOLIC BLOOD PRESSURE: 96 MMHG | SYSTOLIC BLOOD PRESSURE: 146 MMHG

## 2020-07-06 NOTE — NUR
ASSUMED CARE PT SHIFT CHANGE. ASSESSMENTS AS CHARTED. MEDS NOT GIVEN DUE TO PT
BEING NPO. ALERT AND ORIENTED. VSS. PT QUADRIPLEGIC, TURNS ENFORCED. CAREGIVER
AT BEDSIDE THROUGH DAY. CARDS UNABLE TO GET PROCEDURE DONE TODAY. WILL DO
TOMORROW. GI TO DO PEG TUBE PROCEDURE TOMORROW AS WELL. CAREGIVER AND PT
UPDATED. SUPRAPUBIC CATH CONTINUES TO LEAK. HOSPITALIST NOTIFIED. PEG TUBE
SITE LEAKING MINIMAL. DRESSING REMAINS ON. PT CURRENTLY RESTING COMFORTABLY IN
BED. REPORT GIVEN TO EWA PEDROZA.

## 2020-07-06 NOTE — NUR
Sp with caregiver Joelle planned arteriogram today. patient npo for GI
procedure in am. Bethany reports concern regarding ng. requestes Dr Warren
call Bethany to discuss. casemgt following.

## 2020-07-06 NOTE — NUR
REFUSED REPOSITIONING AND REFUSED PRAFO BOOTS TONIGHT.TUBE FEEDING STOPPED
AROUND MIDNIGHT FOR A POSSIBLE PROCEDURE TODAY.ROSE TO DD.DENIES PAIN.MONITOR
SHOWS SR,ST.POC CONTINUED.

## 2020-07-07 VITALS — SYSTOLIC BLOOD PRESSURE: 88 MMHG | DIASTOLIC BLOOD PRESSURE: 57 MMHG

## 2020-07-07 VITALS — SYSTOLIC BLOOD PRESSURE: 102 MMHG | DIASTOLIC BLOOD PRESSURE: 67 MMHG

## 2020-07-07 VITALS — DIASTOLIC BLOOD PRESSURE: 76 MMHG | SYSTOLIC BLOOD PRESSURE: 126 MMHG

## 2020-07-07 VITALS — SYSTOLIC BLOOD PRESSURE: 95 MMHG | DIASTOLIC BLOOD PRESSURE: 58 MMHG

## 2020-07-07 VITALS — DIASTOLIC BLOOD PRESSURE: 58 MMHG | SYSTOLIC BLOOD PRESSURE: 95 MMHG

## 2020-07-07 VITALS — SYSTOLIC BLOOD PRESSURE: 114 MMHG | DIASTOLIC BLOOD PRESSURE: 69 MMHG

## 2020-07-07 VITALS — SYSTOLIC BLOOD PRESSURE: 109 MMHG | DIASTOLIC BLOOD PRESSURE: 63 MMHG

## 2020-07-07 VITALS — DIASTOLIC BLOOD PRESSURE: 60 MMHG | SYSTOLIC BLOOD PRESSURE: 100 MMHG

## 2020-07-07 VITALS — SYSTOLIC BLOOD PRESSURE: 154 MMHG | DIASTOLIC BLOOD PRESSURE: 87 MMHG

## 2020-07-07 VITALS — DIASTOLIC BLOOD PRESSURE: 66 MMHG | SYSTOLIC BLOOD PRESSURE: 102 MMHG

## 2020-07-07 VITALS — SYSTOLIC BLOOD PRESSURE: 122 MMHG | DIASTOLIC BLOOD PRESSURE: 69 MMHG

## 2020-07-07 VITALS — SYSTOLIC BLOOD PRESSURE: 118 MMHG | DIASTOLIC BLOOD PRESSURE: 73 MMHG

## 2020-07-07 VITALS — SYSTOLIC BLOOD PRESSURE: 112 MMHG | DIASTOLIC BLOOD PRESSURE: 68 MMHG

## 2020-07-07 VITALS — SYSTOLIC BLOOD PRESSURE: 111 MMHG | DIASTOLIC BLOOD PRESSURE: 69 MMHG

## 2020-07-07 VITALS — DIASTOLIC BLOOD PRESSURE: 67 MMHG | SYSTOLIC BLOOD PRESSURE: 105 MMHG

## 2020-07-07 VITALS — SYSTOLIC BLOOD PRESSURE: 95 MMHG | DIASTOLIC BLOOD PRESSURE: 55 MMHG

## 2020-07-07 VITALS — SYSTOLIC BLOOD PRESSURE: 93 MMHG | DIASTOLIC BLOOD PRESSURE: 61 MMHG

## 2020-07-07 VITALS — SYSTOLIC BLOOD PRESSURE: 102 MMHG | DIASTOLIC BLOOD PRESSURE: 69 MMHG

## 2020-07-07 LAB
ANION GAP SERPL CALC-SCNC: 7 MMOL/L (ref 7–16)
ANISOCYTOSIS BLD QL SMEAR: (no result)
BASOPHILS NFR BLD AUTO: 0 % (ref 0–2)
BUN SERPL-MCNC: 16 MG/DL (ref 7–18)
CALCIUM SERPL-MCNC: 9 MG/DL (ref 8.5–10.1)
CHLORIDE SERPL-SCNC: 97 MMOL/L (ref 98–107)
CO2 SERPL-SCNC: 29 MMOL/L (ref 21–32)
CREAT SERPL-MCNC: 0.5 MG/DL (ref 0.7–1.3)
EOSINOPHIL NFR BLD: 0 % (ref 0–3)
ERYTHROCYTE [DISTWIDTH] IN BLOOD BY AUTOMATED COUNT: 16.2 % (ref 10.5–14.5)
GLUCOSE SERPL-MCNC: 116 MG/DL (ref 74–106)
GRANULOCYTES NFR BLD MANUAL: 81 % (ref 36–66)
HCT VFR BLD CALC: 39.8 % (ref 42–52)
HGB BLD-MCNC: 13.1 GM/DL (ref 14–18)
LYMPHOCYTES NFR BLD AUTO: 7 % (ref 24–44)
MCH RBC QN AUTO: 30.5 PG (ref 26–34)
MCHC RBC AUTO-ENTMCNC: 32.9 G/DL (ref 28–37)
MCV RBC: 92.5 FL (ref 80–100)
METAMYELOCYTES NFR BLD: 1 %
MONOCYTES NFR BLD: 11 % (ref 1–8)
NEUTROPHILS # BLD: 13 THOU/UL (ref 1.4–8.2)
NEUTS BAND NFR BLD: 0 % (ref 0–8)
PLATELET # BLD: 227 THOU/UL (ref 150–400)
POTASSIUM SERPL-SCNC: 3.7 MMOL/L (ref 3.5–5.1)
RBC # BLD AUTO: 4.3 MIL/UL (ref 4.5–6)
SODIUM SERPL-SCNC: 133 MMOL/L (ref 136–145)
WBC # BLD AUTO: 16.1 THOU/UL (ref 4–11)

## 2020-07-07 PROCEDURE — B41D1ZZ FLUOROSCOPY OF AORTA AND BILATERAL LOWER EXTREMITY ARTERIES USING LOW OSMOLAR CONTRAST: ICD-10-PCS | Performed by: INTERNAL MEDICINE

## 2020-07-07 NOTE — NUR
NPO FOR POSSIBLE PROCEDURE TODAY.DRESSING CHANGE DONE.O2 2L NC.MOUTH
SECRETIONS NOTED.IVETTEKAUER CONNECTED FOR SUCTION.COMPLAIN OF PAIN BUT REFUSES
PAIN MEDS.MONITOR SHOWS SINUS TACHY.POC CONTINUED.

## 2020-07-07 NOTE — NUR
ASSUMED CARE PT SHIFT CHANGE. ASSESSMENTS AS CHARTED.MEDS GIVEN PER MAR. PT
ALERT, ORIENTED. VSS. DENIES PAIN. O2 SATS WNL ON 2L. Q2 TURNS ENFORCED, PT
WOULD REFUSE AT TIMES. EDUCATED ABOUT IMPORTANCE OF TURNING AND WOUNDS ON
SARAL AREA. PT HAD ANGIOGRAPHY PROCEDURE WITH STENT PLACEMENT--SEE REPORT.
UPON RETURN LEFT GROIN CDI, NO HEMATOMA. VSS, POST CATH VITALS INITIATED.
BEDREST ORDERS IMPLEMENTED. PPN STARTED PER GI ORDERS. FLUIDS INFUSING PER
CARDIOLGY ORDERS. PT RESTING COMFORTABLY IN BED. CONTINUING TO MONITOR.

## 2020-07-08 VITALS — SYSTOLIC BLOOD PRESSURE: 110 MMHG | DIASTOLIC BLOOD PRESSURE: 52 MMHG

## 2020-07-08 VITALS — DIASTOLIC BLOOD PRESSURE: 52 MMHG | SYSTOLIC BLOOD PRESSURE: 111 MMHG

## 2020-07-08 VITALS — SYSTOLIC BLOOD PRESSURE: 148 MMHG | DIASTOLIC BLOOD PRESSURE: 65 MMHG

## 2020-07-08 VITALS — DIASTOLIC BLOOD PRESSURE: 70 MMHG | SYSTOLIC BLOOD PRESSURE: 134 MMHG

## 2020-07-08 VITALS — SYSTOLIC BLOOD PRESSURE: 130 MMHG | DIASTOLIC BLOOD PRESSURE: 59 MMHG

## 2020-07-08 VITALS — SYSTOLIC BLOOD PRESSURE: 136 MMHG | DIASTOLIC BLOOD PRESSURE: 71 MMHG

## 2020-07-08 VITALS — SYSTOLIC BLOOD PRESSURE: 124 MMHG | DIASTOLIC BLOOD PRESSURE: 60 MMHG

## 2020-07-08 VITALS — SYSTOLIC BLOOD PRESSURE: 112 MMHG | DIASTOLIC BLOOD PRESSURE: 61 MMHG

## 2020-07-08 VITALS — DIASTOLIC BLOOD PRESSURE: 59 MMHG | SYSTOLIC BLOOD PRESSURE: 130 MMHG

## 2020-07-08 VITALS — DIASTOLIC BLOOD PRESSURE: 56 MMHG | SYSTOLIC BLOOD PRESSURE: 103 MMHG

## 2020-07-08 VITALS — SYSTOLIC BLOOD PRESSURE: 102 MMHG | DIASTOLIC BLOOD PRESSURE: 69 MMHG

## 2020-07-08 LAB
ANION GAP SERPL CALC-SCNC: 3 MMOL/L (ref 7–16)
BUN SERPL-MCNC: 24 MG/DL (ref 7–18)
CALCIUM SERPL-MCNC: 8.2 MG/DL (ref 8.5–10.1)
CHLORIDE SERPL-SCNC: 97 MMOL/L (ref 98–107)
CO2 SERPL-SCNC: 32 MMOL/L (ref 21–32)
CREAT SERPL-MCNC: 0.5 MG/DL (ref 0.7–1.3)
ERYTHROCYTE [DISTWIDTH] IN BLOOD BY AUTOMATED COUNT: 16.4 % (ref 10.5–14.5)
GLUCOSE SERPL-MCNC: 146 MG/DL (ref 74–106)
HCT VFR BLD CALC: 36.3 % (ref 42–52)
HGB BLD-MCNC: 11.8 GM/DL (ref 14–18)
MCH RBC QN AUTO: 30.2 PG (ref 26–34)
MCHC RBC AUTO-ENTMCNC: 32.4 G/DL (ref 28–37)
MCV RBC: 93.4 FL (ref 80–100)
PLATELET # BLD: 208 THOU/UL (ref 150–400)
POTASSIUM SERPL-SCNC: 4 MMOL/L (ref 3.5–5.1)
RBC # BLD AUTO: 3.89 MIL/UL (ref 4.5–6)
SODIUM SERPL-SCNC: 132 MMOL/L (ref 136–145)
WBC # BLD AUTO: 14.2 THOU/UL (ref 4–11)

## 2020-07-08 PROCEDURE — 047L3DZ DILATION OF LEFT FEMORAL ARTERY WITH INTRALUMINAL DEVICE, PERCUTANEOUS APPROACH: ICD-10-PCS | Performed by: INTERNAL MEDICINE

## 2020-07-08 PROCEDURE — 04CL3ZZ EXTIRPATION OF MATTER FROM LEFT FEMORAL ARTERY, PERCUTANEOUS APPROACH: ICD-10-PCS | Performed by: INTERNAL MEDICINE

## 2020-07-08 NOTE — NUR
ASSESSMENT AS DOCUMENTED. VSS. SINUS TACH ON THE MONITOR. ASSUMED CARE OF THE
PT APPROXIMATELY 0930. PT NPO. PT TO REMAIN NPO UNTIL SPEECH EVAL TO BE DONE
IN THE MORNING (07/09). PPN RESUMED WHEN BACK FROM IR. R GROIN DRESSING C/D/I.
NO S/SX OF HEMATOMA OR BLEEDING. PT OFF THE UNIT FOR GI PEG TUBE PROCEDURE AND
IR ARTERIOGRAM. PT OFF UNIT APPROXIMATELY FROM 1200 TO 1715. PT RESTING IN BED
WITH CALL LIGHT IN REACH. WILL CONTINUE TO MONITOR AND TURN PT Q2 HRS.

## 2020-07-08 NOTE — P
[facility]
[address]ira [address]
[place]   [address]                     PROCEDURE REPORT              
_______________________________________________________________________________
 
Name:       IDA HUMPHRIES                Room #:         201-P       [facility] [place]  
M.R.#:      [id number]                       Account #:      [id number]  
Admission:  06/30/20    Attend Phys:    Binh Jeffers MD   
Discharge:              Date of Birth:  [birth year]  
                                                          Report #: [id number]
                                                                    [id number]   
_______________________________________________________________________________
THIS REPORT FOR:  
 
cc:  [facility] physician unknown
     [facility] physician unknown                                       
     Boyd Reid MD                                           ~
CC: Binh Jeffers
    [facility] unknown
 
BRIEF HISTORY:  The patient is a 79-year-old male with multiple medical problems
with an indwelling PEG tube with leakage around the tube.  The patient presents
for replacement of indwelling PEG tube with up sizing of the PEG tube due to
leakage. 
 
PREOPERATIVE DIAGNOSIS:  Dysphagia and current PEG tube with leakage.
 
POSTOPERATIVE DIAGNOSIS:  Dysphagia and current PEG tube with leakage.
 
MEDICATIONS:  Deep sedation with propofol per anesthesia.
 
SPECIMEN:  None.
 
ESTIMATED BLOOD LOSS:  None.
 
PROCEDURE:  Percutaneous endoscopic gastrostomy tube placement.
 
FINDINGS:  Prior to propofol sedation, procedure of PEG tube replacement with an
upsized tube was discussed with the patient as well as wife.  They indicate they
understand and desire that we proceed.
 
DESCRIPTION OF PROCEDURE:  With the patient in supine position, his head and
chest were raised about 25-30 degrees, the Olympus video endoscope was inserted
in cervical esophagus under direct vision without difficulty.  Examination of
this organ through its entire length revealed normal esophageal mucosa down the
squamocolumnar junction.  Squamocolumnar junction was inspected and noted to be
unremarkable.  Scope was advanced in the stomach, was examined on end view as
well as retroflexed views.  The gastric mucosa was unremarkable.  In the distal
body of the stomach, the balloon tipped gastrostomy tube was seen entering the
stomach and was noted to be unremarkable.  There was no evidence of mucosal
ulceration.  Upon retroflexion, no mass or lesions were seen.  The antrum was
unremarkable.  Pylorus, duodenal bulb, and postbulbar duodenal sweep down to
third portion were unremarkable.
 
Scope was withdrawn back into the body of the stomach.  Very carefully, a
guidewire was passed alongside the indwelling PEG tube.  This was grasped with a
snare and once the guidewire was secure, the balloon was let down on the
existing gastrostomy tube and the tube was pulled out.  We used the existing
 
 
 
[facility]
[address]
[place]   [address]                     PROCEDURE REPORT              
_______________________________________________________________________________
 
Name:       IDA HUMPHRIES                Room #:         201-P       [facility] [place]  
M.R.#:      [id number]                       Account #:      [id number]  
Admission:  06/30/20    Attend Phys:    Binh Jeffers MD   
Discharge:              Date of Birth:  [birth year]  
                                                          Report #: [id number]
                                                                    [id number]   
_______________________________________________________________________________
fistula.  Subsequently, a 24-Kazakh gastrostomy tube advanced over the wire with
leading tip seen coming through the fistula, which was grasped and the tube was
pulled in position.  The external restraining device was applied.  The patient
tolerated the procedure well.
 
DISPOSITION:  Indwelling PEG tube removed and replaced with a new 24-Kazakh
tube.  The previous tube was 22-Kazakh.  I have discussed with the patient's
caregiver management of the PEG tube.  I have explained that they should not put
any tension whatsoever on the PEG tube, so that it puts pressure on the fistula
and causes further erosion.  If this tube leaks, he may need removal of the PEG
tube to allow closure of the existing fistula and insertion of a new PEG tube at
a different site.  At this point in time, may resume tube feedings.
 
 
 
 
 
 
 
 
 
 
 
 
 
 
 
 
 
 
 
 
 
 
 
 
 
 
 
 
 
 
 
 
  <ELECTRONICALLY SIGNED>
   By: Boyd Reid MD           
  07/08/20     1551
D: 07/08/20 1327                           _____________________________________
T: 07/08/20 1504                           Boyd Reid MD             /nt

## 2020-07-09 VITALS — DIASTOLIC BLOOD PRESSURE: 71 MMHG | SYSTOLIC BLOOD PRESSURE: 167 MMHG

## 2020-07-09 VITALS — DIASTOLIC BLOOD PRESSURE: 78 MMHG | SYSTOLIC BLOOD PRESSURE: 151 MMHG

## 2020-07-09 VITALS — DIASTOLIC BLOOD PRESSURE: 59 MMHG | SYSTOLIC BLOOD PRESSURE: 120 MMHG

## 2020-07-09 VITALS — DIASTOLIC BLOOD PRESSURE: 67 MMHG | SYSTOLIC BLOOD PRESSURE: 132 MMHG

## 2020-07-09 VITALS — SYSTOLIC BLOOD PRESSURE: 148 MMHG | DIASTOLIC BLOOD PRESSURE: 62 MMHG

## 2020-07-09 LAB
ALBUMIN SERPL-MCNC: 2.5 G/DL (ref 3.4–5)
ALT SERPL-CCNC: 17 U/L (ref 30–65)
ANION GAP SERPL CALC-SCNC: 3 MMOL/L (ref 7–16)
ANION GAP SERPL CALC-SCNC: 5 MMOL/L (ref 7–16)
AST SERPL-CCNC: 15 U/L (ref 15–37)
BILIRUB SERPL-MCNC: 0.2 MG/DL (ref 0.2–1)
BUN SERPL-MCNC: 22 MG/DL (ref 7–18)
BUN SERPL-MCNC: 24 MG/DL (ref 7–18)
CALCIUM SERPL-MCNC: 8.5 MG/DL (ref 8.5–10.1)
CALCIUM SERPL-MCNC: 8.6 MG/DL (ref 8.5–10.1)
CHLORIDE SERPL-SCNC: 95 MMOL/L (ref 98–107)
CHLORIDE SERPL-SCNC: 96 MMOL/L (ref 98–107)
CO2 SERPL-SCNC: 31 MMOL/L (ref 21–32)
CO2 SERPL-SCNC: 32 MMOL/L (ref 21–32)
CREAT SERPL-MCNC: 0.4 MG/DL (ref 0.7–1.3)
CREAT SERPL-MCNC: 0.4 MG/DL (ref 0.7–1.3)
ERYTHROCYTE [DISTWIDTH] IN BLOOD BY AUTOMATED COUNT: 16 % (ref 10.5–14.5)
GLUCOSE SERPL-MCNC: 138 MG/DL (ref 74–106)
GLUCOSE SERPL-MCNC: 207 MG/DL (ref 74–106)
HCT VFR BLD CALC: 33.3 % (ref 42–52)
HGB BLD-MCNC: 11 GM/DL (ref 14–18)
MAGNESIUM SERPL-MCNC: 2 MG/DL (ref 1.8–2.4)
MCH RBC QN AUTO: 30.5 PG (ref 26–34)
MCHC RBC AUTO-ENTMCNC: 32.9 G/DL (ref 28–37)
MCV RBC: 92.8 FL (ref 80–100)
PLATELET # BLD: 203 THOU/UL (ref 150–400)
POTASSIUM SERPL-SCNC: 4.2 MMOL/L (ref 3.5–5.1)
POTASSIUM SERPL-SCNC: 4.6 MMOL/L (ref 3.5–5.1)
PROT SERPL-MCNC: 7.5 G/DL (ref 6.4–8.2)
RBC # BLD AUTO: 3.59 MIL/UL (ref 4.5–6)
SODIUM SERPL-SCNC: 131 MMOL/L (ref 136–145)
SODIUM SERPL-SCNC: 131 MMOL/L (ref 136–145)
WBC # BLD AUTO: 13.1 THOU/UL (ref 4–11)

## 2020-07-09 NOTE — NUR
PT ON SERVICE WITH [facility] PRIOR TO ADM FAXED CLINICAL UPDATE SPOKE WITH
AISHA IN INTAKE SHE RECEIVED UPDATE. DP TO FOLLOW.

## 2020-07-10 VITALS — SYSTOLIC BLOOD PRESSURE: 154 MMHG | DIASTOLIC BLOOD PRESSURE: 85 MMHG

## 2020-07-10 VITALS — SYSTOLIC BLOOD PRESSURE: 142 MMHG | DIASTOLIC BLOOD PRESSURE: 79 MMHG

## 2020-07-10 VITALS — SYSTOLIC BLOOD PRESSURE: 134 MMHG | DIASTOLIC BLOOD PRESSURE: 70 MMHG

## 2020-07-10 VITALS — SYSTOLIC BLOOD PRESSURE: 152 MMHG | DIASTOLIC BLOOD PRESSURE: 76 MMHG

## 2020-07-10 VITALS — SYSTOLIC BLOOD PRESSURE: 155 MMHG | DIASTOLIC BLOOD PRESSURE: 68 MMHG

## 2020-07-10 LAB
ANION GAP SERPL CALC-SCNC: 1 MMOL/L (ref 7–16)
BUN SERPL-MCNC: 25 MG/DL (ref 7–18)
CALCIUM SERPL-MCNC: 8.9 MG/DL (ref 8.5–10.1)
CHLORIDE SERPL-SCNC: 95 MMOL/L (ref 98–107)
CO2 SERPL-SCNC: 34 MMOL/L (ref 21–32)
CREAT SERPL-MCNC: 0.4 MG/DL (ref 0.7–1.3)
ERYTHROCYTE [DISTWIDTH] IN BLOOD BY AUTOMATED COUNT: 15.9 % (ref 10.5–14.5)
GLUCOSE SERPL-MCNC: 165 MG/DL (ref 74–106)
HCT VFR BLD CALC: 32.2 % (ref 42–52)
HGB BLD-MCNC: 10.6 GM/DL (ref 14–18)
MAGNESIUM SERPL-MCNC: 2.1 MG/DL (ref 1.8–2.4)
MCH RBC QN AUTO: 30.6 PG (ref 26–34)
MCHC RBC AUTO-ENTMCNC: 33.1 G/DL (ref 28–37)
MCV RBC: 92.4 FL (ref 80–100)
PLATELET # BLD: 221 THOU/UL (ref 150–400)
POTASSIUM SERPL-SCNC: 5.2 MMOL/L (ref 3.5–5.1)
RBC # BLD AUTO: 3.48 MIL/UL (ref 4.5–6)
SODIUM SERPL-SCNC: 130 MMOL/L (ref 136–145)
WBC # BLD AUTO: 12.1 THOU/UL (ref 4–11)

## 2020-07-10 NOTE — NUR
ASSUMED PT CARE AT 0700, PT ASSESSED, VSS, POC REVIEWED, PT VERBALIZED
UNDERSTANDING, MEDS GIVEN VIA PEG TUBE, BREAKFAST TRAY OFFERED, PT ASKED WHAT
WAS ON THE TRAY AND THEN REPLIED THAT HE DIDN'T WANT TO EAT ANY OF IT. TUBE
FEEDING INCREASED TO 35, PPN DISCONTINUED PER ORDERS, DRESSING CHANGES DONE,
PT REPOSITIONED, MORPHINE GIVEN FOR PAIN PRIOR TO DRESSING CHANGES. PT RESTING
IN BED, WILL MONITOR

## 2020-07-10 NOTE — NUR
ASSUMED PT CARE AT 1900. PT IS ALERT AND ORIENTED. PT IS A QUADRAPLEGIC. NO
SIGN OF DISTRESS NOTED IN PT. PT IS STABLE. TUBE FEEDING IN PLACE. ASSESSMENT
COMPLETED AND DOCUMENTED. REPOSITIONING DONE. PT DOES REFUSED TO BE
REPOSITION. SCHEDULED MEDS ADMINISTERED TO PT VIA PEG TUBE. TUBE FEEDING RATE
INCREASE TO 50 CC/HR BUT PATIENT DIDNT TOLERATE THAT RATE. TUBE FEEDING HELD
DUE TO HIGH RESIDUALS. CONTINUE TO MONITOR PT. NO FURTHER NEEDS AT THIS TIME

## 2020-07-10 NOTE — NUR
If patient to [place] home over weekend please call home health care
[phone] Fax orders to [phone]. [phone].
Patient does not have nebulizer at home. If ordered call [facility] [phone]
Fax [phone]. Fax script and pertinent information to [facility] to deliver
nebulzer to home.
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
## 2020-07-10 NOTE — NUR
Since pt now allowed to eat, will adjust tube feeding goal rate to 60ml/hr of
glucerna 1.2.  Starting a calorie count today.

## 2020-07-11 VITALS — DIASTOLIC BLOOD PRESSURE: 96 MMHG | SYSTOLIC BLOOD PRESSURE: 183 MMHG

## 2020-07-11 VITALS — SYSTOLIC BLOOD PRESSURE: 129 MMHG | DIASTOLIC BLOOD PRESSURE: 75 MMHG

## 2020-07-11 VITALS — SYSTOLIC BLOOD PRESSURE: 135 MMHG | DIASTOLIC BLOOD PRESSURE: 73 MMHG

## 2020-07-11 VITALS — DIASTOLIC BLOOD PRESSURE: 74 MMHG | SYSTOLIC BLOOD PRESSURE: 161 MMHG

## 2020-07-11 VITALS — SYSTOLIC BLOOD PRESSURE: 132 MMHG | DIASTOLIC BLOOD PRESSURE: 67 MMHG

## 2020-07-11 LAB
ANION GAP SERPL CALC-SCNC: 1 MMOL/L (ref 7–16)
BUN SERPL-MCNC: 20 MG/DL (ref 7–18)
CALCIUM SERPL-MCNC: 9.3 MG/DL (ref 8.5–10.1)
CHLORIDE SERPL-SCNC: 94 MMOL/L (ref 98–107)
CO2 SERPL-SCNC: 35 MMOL/L (ref 21–32)
CREAT SERPL-MCNC: 0.4 MG/DL (ref 0.7–1.3)
ERYTHROCYTE [DISTWIDTH] IN BLOOD BY AUTOMATED COUNT: 15.8 % (ref 10.5–14.5)
GLUCOSE SERPL-MCNC: 150 MG/DL (ref 74–106)
HCT VFR BLD CALC: 34.6 % (ref 42–52)
HGB BLD-MCNC: 11.2 GM/DL (ref 14–18)
MAGNESIUM SERPL-MCNC: 1.9 MG/DL (ref 1.8–2.4)
MCH RBC QN AUTO: 30 PG (ref 26–34)
MCHC RBC AUTO-ENTMCNC: 32.4 G/DL (ref 28–37)
MCV RBC: 92.5 FL (ref 80–100)
PLATELET # BLD: 238 THOU/UL (ref 150–400)
POTASSIUM SERPL-SCNC: 5.2 MMOL/L (ref 3.5–5.1)
RBC # BLD AUTO: 3.74 MIL/UL (ref 4.5–6)
SODIUM SERPL-SCNC: 130 MMOL/L (ref 136–145)
WBC # BLD AUTO: 14.2 THOU/UL (ref 4–11)

## 2020-07-11 NOTE — NUR
pt refusing turns all day, refusing to eat, took one bite of a cookie at
dinner and wanted nothing else. pt refusing to let rn change his dressings on
his backside, did change abdominal and foot dressings.

## 2020-07-11 NOTE — NUR
ASSUMED PT CARE AT 1900. PT IS ALERT AND ORIENTED. NO SIGN OF DISTRESS NOTED
IN PT. PT IS STABLE. ASSESSMENT COMPLETED AND DOCUMENTED. TUBE FEEDING IN
PLACE. SCHEDULED MEDS ADMINISTERED TO PT VIA PEG TUBE. NO SIGN OF DISTRESS
NOTED IN PT. CONTINUE TO MONITOR.

## 2020-07-12 VITALS — SYSTOLIC BLOOD PRESSURE: 140 MMHG | DIASTOLIC BLOOD PRESSURE: 61 MMHG

## 2020-07-12 VITALS — SYSTOLIC BLOOD PRESSURE: 158 MMHG | DIASTOLIC BLOOD PRESSURE: 81 MMHG

## 2020-07-12 VITALS — SYSTOLIC BLOOD PRESSURE: 127 MMHG | DIASTOLIC BLOOD PRESSURE: 72 MMHG

## 2020-07-12 VITALS — SYSTOLIC BLOOD PRESSURE: 150 MMHG | DIASTOLIC BLOOD PRESSURE: 93 MMHG

## 2020-07-12 VITALS — DIASTOLIC BLOOD PRESSURE: 81 MMHG | SYSTOLIC BLOOD PRESSURE: 158 MMHG

## 2020-07-12 LAB
ANION GAP SERPL CALC-SCNC: 6 MMOL/L (ref 7–16)
BUN SERPL-MCNC: 15 MG/DL (ref 7–18)
CALCIUM SERPL-MCNC: 6.8 MG/DL (ref 8.5–10.1)
CHLORIDE SERPL-SCNC: 103 MMOL/L (ref 98–107)
CO2 SERPL-SCNC: 26 MMOL/L (ref 21–32)
CREAT SERPL-MCNC: 0.3 MG/DL (ref 0.7–1.3)
ERYTHROCYTE [DISTWIDTH] IN BLOOD BY AUTOMATED COUNT: 16.1 % (ref 10.5–14.5)
GLUCOSE SERPL-MCNC: 146 MG/DL (ref 74–106)
HCT VFR BLD CALC: 29.4 % (ref 42–52)
HGB BLD-MCNC: 9.6 GM/DL (ref 14–18)
MAGNESIUM SERPL-MCNC: 1.5 MG/DL (ref 1.8–2.4)
MCH RBC QN AUTO: 30.5 PG (ref 26–34)
MCHC RBC AUTO-ENTMCNC: 32.6 G/DL (ref 28–37)
MCV RBC: 93.6 FL (ref 80–100)
PLATELET # BLD: 209 THOU/UL (ref 150–400)
POTASSIUM SERPL-SCNC: 3.4 MMOL/L (ref 3.5–5.1)
RBC # BLD AUTO: 3.14 MIL/UL (ref 4.5–6)
SODIUM SERPL-SCNC: 135 MMOL/L (ref 136–145)
WBC # BLD AUTO: 12.4 THOU/UL (ref 4–11)

## 2020-07-12 NOTE — NUR
PT IS ALERT AND ORIENTED X4. LUNGS ARE COARSE. AND COGESTED COUGHS CLEAR FLUID
UP SO SUCTION WITH [facility] FOR PT. CONTRACTED. Q 2 HOURS TURNS. AND DRESSING
CHANGE ON RIGHT HEEL AND COCCYX WOUND DONE PER NURSING STAFF THIS AM. SITING
UPRIGHT HAD A LARGE BROWN SOFT FORMED STOOL. SAYS HE FEELS BETTER THIS AM.
ABDOMEN IS ROUND. AND FIRM. BUT HE REPORTS FEELING BETTER. BOOT ON LEFT HEEL.
SITING IN AN 45 DEGREE ANGLE. WILL CONTINUE TO ASSESS AND MONITOR PER NURSING

## 2020-07-12 NOTE — NUR
ASSUMMED PT CARE AT APPROXIMATELY 0700. PT A&O X3/4. ASSESSMENT AS CHARTED.
FALL PRECAUTIONS IN PLACE. PT DENIES HAVING CHEST PAIN. PT DENIES HAVING SOB.
PT STATED HE HAD OCCASIONAL PAIN. PT RECEIVED ANALGESICS. PT STATED ANALGESICS
HELPED RELIEVE PAIN. WOUND CARE GIVEN. VITAL SIGNS STABLE. BLOOD SUGARS
STABLE. EDUCATED PT ABOUT POC. PT STATED UNDERSTANDING. SEE TUBE FEEDING
INTERVENTION. PT COMFORTABLE IN BED. PT DENIES HAVING FURTHER CONCERNS.

## 2020-07-13 VITALS — SYSTOLIC BLOOD PRESSURE: 110 MMHG | DIASTOLIC BLOOD PRESSURE: 52 MMHG

## 2020-07-13 VITALS — DIASTOLIC BLOOD PRESSURE: 61 MMHG | SYSTOLIC BLOOD PRESSURE: 126 MMHG

## 2020-07-13 VITALS — DIASTOLIC BLOOD PRESSURE: 52 MMHG | SYSTOLIC BLOOD PRESSURE: 110 MMHG

## 2020-07-13 VITALS — SYSTOLIC BLOOD PRESSURE: 115 MMHG | DIASTOLIC BLOOD PRESSURE: 50 MMHG

## 2020-07-13 VITALS — SYSTOLIC BLOOD PRESSURE: 133 MMHG | DIASTOLIC BLOOD PRESSURE: 75 MMHG

## 2020-07-13 LAB
ANION GAP SERPL CALC-SCNC: 7 MMOL/L (ref 7–16)
BUN SERPL-MCNC: 16 MG/DL (ref 7–18)
CALCIUM SERPL-MCNC: 8.4 MG/DL (ref 8.5–10.1)
CHLORIDE SERPL-SCNC: 98 MMOL/L (ref 98–107)
CO2 SERPL-SCNC: 29 MMOL/L (ref 21–32)
CREAT SERPL-MCNC: 0.4 MG/DL (ref 0.7–1.3)
ERYTHROCYTE [DISTWIDTH] IN BLOOD BY AUTOMATED COUNT: 16.1 % (ref 10.5–14.5)
GLUCOSE SERPL-MCNC: 168 MG/DL (ref 74–106)
HCT VFR BLD CALC: 31.6 % (ref 42–52)
HGB BLD-MCNC: 10.2 GM/DL (ref 14–18)
MAGNESIUM SERPL-MCNC: 2.2 MG/DL (ref 1.8–2.4)
MCH RBC QN AUTO: 29.9 PG (ref 26–34)
MCHC RBC AUTO-ENTMCNC: 32.3 G/DL (ref 28–37)
MCV RBC: 92.6 FL (ref 80–100)
PLATELET # BLD: 260 THOU/UL (ref 150–400)
POTASSIUM SERPL-SCNC: 4.2 MMOL/L (ref 3.5–5.1)
RBC # BLD AUTO: 3.42 MIL/UL (ref 4.5–6)
SODIUM SERPL-SCNC: 134 MMOL/L (ref 136–145)
WBC # BLD AUTO: 13.7 THOU/UL (ref 4–11)

## 2020-07-13 NOTE — NUR
JEMIMAO. CALM. DR. LOUIS HERE, MAY DISCHARGE PATIENT DEPENDING ON ST EVAL. WIFE
AT BEDSIDE. SR/ST PER TELE. FALL PRECAUTIONS IN PLACE. WILL CONTINUE TO FOLLOW
CLOSELY.

## 2020-07-13 NOTE — NUR
ASSUMED PATIENT CARE AT 1845. VITAL SIGNS STABLE WITH PATIENT HAVING NO
COMPLAINTS OF NAUSEA. PATIENT DID COMPLAIN OF PAIN IN LEFT LEG/FOOT WHICH WAS
TREATED THROUGH MEDICATIONS AND REPOSITIONING. ALERT AND ORIENTED BUT
FORGETFUL, PATIENT IS ABLE TO PARTICIPATE IN CARE. BREATHING STABLE AS
EVIDENCED BY ASSESSMENTS AND CONTINUOUS SATURATION MONITORING. PATIENT KEPT
NPO THROUGHOUT SHIFT. PATIENT REQUIRED DEEP SUCTION VIA RT AND PRODUCED
COPIOUS AMOUNTS OF ORAL SECRETIONS. FREQUENT SUCTION VIA YAUNKER BY NURSING.
SACRAL WOUNDS CHANGED MULTIPLE TIMES DUE TO FECAL INCONTINENCE. PATIENT IS
UPSET THAT HE HAS NOT SLEPT WELL OVER SHIFT. CONTINUE PLAN OF CARE.

## 2020-07-13 NOTE — NUR
Patient to dc home today. [facility] to have HH [facility]. [place] planner
faxed orders. Sp with [facility] caregiver at bedside. Arranged [facility] for
[phone] for home. verified address. Nebulizer script sent to [facility] for
nebulizer for home. no further needs

## 2020-07-13 NOTE — NUR
Calorie count was in progress however pt refused all meals 7/11, then npo, and
now on puree diet as pleasure feeds.  Recommend continue glucerna 1.2 at
80ml/hr continuous

## 2020-07-13 NOTE — NUR
PT DISCHARGING TODAY TO HOME WITH [facility] FAXED DC ORDERS/SUMMARY RECEIVED
CONFIRMATION AND SPOKE WITH AISHA IN INTAKE SHE WILL NOTIFY PT TIME OF
VISITS.